# Patient Record
Sex: FEMALE | Employment: OTHER | URBAN - METROPOLITAN AREA
[De-identification: names, ages, dates, MRNs, and addresses within clinical notes are randomized per-mention and may not be internally consistent; named-entity substitution may affect disease eponyms.]

---

## 2019-05-01 ENCOUNTER — CLINICAL SUPPORT (OUTPATIENT)
Dept: CARDIOLOGY | Facility: CLINIC | Age: 79
End: 2019-05-01
Attending: INTERNAL MEDICINE
Payer: MEDICARE

## 2019-05-01 DIAGNOSIS — I25.10 ATHEROSCLEROSIS OF NATIVE CORONARY ARTERY OF NATIVE HEART WITHOUT ANGINA PECTORIS: ICD-10-CM

## 2019-05-01 DIAGNOSIS — R07.9 CHEST PAIN, UNSPECIFIED TYPE: ICD-10-CM

## 2019-05-01 LAB
CV STRESS BASE HR: 58 BPM
DIASTOLIC BLOOD PRESSURE: 63 MMHG
OHS CV CPX 85 PERCENT MAX PREDICTED HEART RATE MALE: 115
OHS CV CPX MAX PREDICTED HEART RATE: 135
OHS CV CPX PATIENT IS FEMALE: 1
OHS CV CPX PATIENT IS MALE: 0
OHS CV CPX PEAK DIASTOLIC BLOOD PRESSURE: 50 MMHG
OHS CV CPX PEAK HEAR RATE: 95 BPM
OHS CV CPX PEAK RATE PRESSURE PRODUCT: NORMAL
OHS CV CPX PEAK SYSTOLIC BLOOD PRESSURE: 136 MMHG
OHS CV CPX PERCENT MAX PREDICTED HEART RATE ACHIEVED: 71
OHS CV CPX RATE PRESSURE PRODUCT PRESENTING: 7366
SYSTOLIC BLOOD PRESSURE: 127 MMHG

## 2019-05-01 PROCEDURE — 93016 CV STRESS TEST SUPVJ ONLY: CPT | Mod: S$GLB,,, | Performed by: STUDENT IN AN ORGANIZED HEALTH CARE EDUCATION/TRAINING PROGRAM

## 2019-05-01 PROCEDURE — 93018 CV STRESS TEST I&R ONLY: CPT | Mod: S$GLB,,, | Performed by: STUDENT IN AN ORGANIZED HEALTH CARE EDUCATION/TRAINING PROGRAM

## 2019-05-01 PROCEDURE — 93016 TREADMILL STRESS TEST (CUPID ONLY): ICD-10-PCS | Mod: S$GLB,,, | Performed by: STUDENT IN AN ORGANIZED HEALTH CARE EDUCATION/TRAINING PROGRAM

## 2019-05-01 PROCEDURE — 93018 TREADMILL STRESS TEST (CUPID ONLY): ICD-10-PCS | Mod: S$GLB,,, | Performed by: STUDENT IN AN ORGANIZED HEALTH CARE EDUCATION/TRAINING PROGRAM

## 2022-12-04 ENCOUNTER — OUTSIDE PLACE OF SERVICE (OUTPATIENT)
Dept: CARDIOLOGY | Facility: CLINIC | Age: 82
End: 2022-12-04
Payer: MEDICARE

## 2022-12-04 PROCEDURE — 93010 PR ELECTROCARDIOGRAM REPORT: ICD-10-PCS | Mod: ,,, | Performed by: INTERNAL MEDICINE

## 2022-12-04 PROCEDURE — 93010 ELECTROCARDIOGRAM REPORT: CPT | Mod: ,,, | Performed by: INTERNAL MEDICINE

## 2025-06-05 ENCOUNTER — HOSPITAL ENCOUNTER (INPATIENT)
Facility: HOSPITAL | Age: 85
LOS: 2 days | Discharge: HOME | DRG: 390 | End: 2025-06-08
Attending: EMERGENCY MEDICINE | Admitting: INTERNAL MEDICINE
Payer: MEDICARE

## 2025-06-05 DIAGNOSIS — K52.89 STERCORAL COLITIS: Primary | ICD-10-CM

## 2025-06-05 DIAGNOSIS — N17.9 AKI (ACUTE KIDNEY INJURY): ICD-10-CM

## 2025-06-05 DIAGNOSIS — K59.00 CONSTIPATION: ICD-10-CM

## 2025-06-05 DIAGNOSIS — E87.6 HYPOKALEMIA: ICD-10-CM

## 2025-06-05 PROCEDURE — 25000003 PHARM REV CODE 250: Performed by: EMERGENCY MEDICINE

## 2025-06-05 RX ORDER — SODIUM CHLORIDE 0.9 G/100ML
500 IRRIGANT IRRIGATION
Status: COMPLETED | OUTPATIENT
Start: 2025-06-05 | End: 2025-06-05

## 2025-06-05 RX ORDER — SYRING-NEEDL,DISP,INSUL,0.3 ML 29 G X1/2"
296 SYRINGE, EMPTY DISPOSABLE MISCELLANEOUS
Status: COMPLETED | OUTPATIENT
Start: 2025-06-05 | End: 2025-06-05

## 2025-06-05 RX ORDER — PSEUDOEPHEDRINE/ACETAMINOPHEN 30MG-500MG
100 TABLET ORAL
Status: COMPLETED | OUTPATIENT
Start: 2025-06-05 | End: 2025-06-05

## 2025-06-05 RX ADMIN — Medication 100 ML: at 10:06

## 2025-06-05 RX ADMIN — MAGNESIUM CITRATE 296 ML: 1.75 LIQUID ORAL at 10:06

## 2025-06-05 RX ADMIN — SODIUM CHLORIDE 500 ML: 900 IRRIGANT IRRIGATION at 10:06

## 2025-06-05 NOTE — ED PROVIDER NOTES
Encounter Date: 6/5/2025       History     Chief Complaint   Patient presents with    Constipation     C/o constipation x4 days. Pt taking suppositories and stool softeners w/o relief. Pt c/o pain to rectum. +rectal bleeding when wiping.      Patient is a 85-year-old female who presents to the ER with a complaint of constipation.  Patient states she has not had a significant bowel movement for approximately 4 days despite use of oral stool softeners.  Report pain to the left lower and right lower quadrant intermittent in nature.  She denies any chest pain or shortness of breath.  She denies any rectal bleeding contrary to was written in the nurse's triage note.  She denies any new medications or other triggers that may be the source of her constipation.      Review of patient's allergies indicates:   Allergen Reactions    Cetirizine Nausea And Vomiting     Unknown reaction    Fexofenadine Nausea And Vomiting     Unknown reaction     History reviewed. No pertinent past medical history.  History reviewed. No pertinent surgical history.  No family history on file.  Social History[1]  Review of Systems   Constitutional:  Negative for chills, fatigue and fever.   Respiratory:  Negative for chest tightness.    Cardiovascular:  Negative for chest pain, palpitations and leg swelling.   Gastrointestinal:  Positive for abdominal pain and constipation. Negative for nausea and vomiting.   Musculoskeletal:  Negative for back pain, myalgias, neck pain and neck stiffness.   Skin:  Negative for pallor and rash.   Neurological:  Negative for dizziness and headaches.   Psychiatric/Behavioral:  Negative for agitation and confusion.        Physical Exam     Initial Vitals [06/05/25 1500]   BP Pulse Resp Temp SpO2   (!) 149/65 83 20 98.2 °F (36.8 °C) 99 %      MAP       --         Physical Exam    Nursing note and vitals reviewed.  Constitutional: She appears well-developed and well-nourished.   HENT:   Head: Normocephalic and  atraumatic.   Eyes: EOM are normal. Pupils are equal, round, and reactive to light.   Neck:   Normal range of motion.  Cardiovascular:  Normal rate.           Pulmonary/Chest: Breath sounds normal.   Abdominal: There is abdominal tenderness.   Genitourinary:    Genitourinary Comments: There is a large external nonthrombosed hemorrhoid at approximally 5/6 o'clock position.  There is hard stool in the rectal vault.  I was able to remove some of this via digital disimpaction.  However the patient still has significant amount of stool in the rectal vault.     Musculoskeletal:         General: Normal range of motion.      Cervical back: Normal range of motion.     Neurological: She is alert and oriented to person, place, and time. She has normal strength. GCS score is 15. GCS eye subscore is 4. GCS verbal subscore is 5. GCS motor subscore is 6.   Skin: Skin is warm. Capillary refill takes less than 2 seconds.   Psychiatric: She has a normal mood and affect.         ED Course   Procedures  Labs Reviewed   CBC W/ AUTO DIFFERENTIAL    Narrative:     The following orders were created for panel order CBC auto differential.  Procedure                               Abnormality         Status                     ---------                               -----------         ------                     CBC with Differential[[id number]]                                                        Please view results for these tests on the individual orders.   COMPREHENSIVE METABOLIC PANEL   URINALYSIS, REFLEX TO URINE CULTURE   LACTIC ACID, PLASMA   CBC WITH DIFFERENTIAL          Imaging Results               CT Abdomen Pelvis  Without Contrast (Final result)  Result time 06/05/25 18:46:05      Final result by Estefanía Fernandez MD (06/05/25 18:46:05)                   Impression:      Findings of distal colon constipation/impaction with mucosal thickening and transmural inflammatory changes/edema in the perirectal fat concerning for  stercoral colitis.  Trace amount of presacral free fluid noted.  No evidence of abscess or extraluminal free air.    No proximal colon constipation or evidence of bowel obstruction or other acute abnormality.    Nonobstructing nephrolithiasis in the right kidney.    Right posterolateral flank fat containing hernia and additional incidental nonacute findings as detailed above.    This report was flagged in Epic as abnormal.      Electronically signed by: Estefanía Fernandez  Date:    06/05/2025  Time:    18:46               Narrative:    EXAMINATION:  CT ABDOMEN PELVIS WITHOUT CONTRAST    CLINICAL HISTORY:  Abdominal abscess/infection suspected;    TECHNIQUE:  Low dose axial images, sagittal and coronal reformations were obtained from the lung bases to the pubic symphysis.  Contrast was not administered.    COMPARISON:  KUB 06/05/2025    FINDINGS:  There is dependent atelectasis in the lung bases with band like opacities more prominent on the right.  Chronic changes are in the differential.  There is no consolidation or pleural effusion.  The visualized heart is nonenlarged.  There is no pericardial effusion.  Aortic valve calcifications in the left coronary artery calcifications noted.    The lack of intravenous contrast limits evaluation of the solid organs for focal lesions.    Liver appears homogeneous and is nonenlarged.  There is no CT evidence of cholelithiasis or cholecystitis.  Spleen appears homogeneous and nonenlarged.  Pancreas and adrenal glands are unremarkable.    There is nonobstructing 2 mm calculus in the lower pole of the right kidney.  No other nephrolithiasis.  There is no hydro ureterectasis.  No renal cortical lesions.    Bladder is unremarkable.    Stomach small bowel are unremarkable.  Appendix is unremarkable.  There is distal rectal constipation/mild impaction.  Mucosal thickening is seen around the rectum and there is perirectal fat stranding with presacral strandy changes also noted.  Trace  amount of fluid may be present.    There is no evidence of bowel obstruction or significant constipation more proximally.  There are scattered diverticuli in the distal colon without evidence of diverticulitis.  There is no evidence of transmural inflammation/edema elsewhere or adenopathy.  No extraluminal free air or free fluid in the abdomen or pelvis is seen.    There is no evidence of retroperitoneal adenopathy.  Aorta contains mild to moderate atherosclerosis without aneurysm.    There is a moderate sized right posterolateral flank fat containing hernia lateral to the right paraspinous muscle group around the level of the lower pole tip of the right kidney.  There is also note bilateral fat containing moderate inguinal hernias.    Spondylosis and osteopenia of the spine noted.  No subluxation or compression fractures.  Degenerative changes of the femoroacetabular joint noted bilaterally.                                       X-Ray Abdomen AP 1 View (KUB) (Final result)  Result time 06/05/25 16:01:40      Final result by Gibran Villegas MD (06/05/25 16:01:40)                   Impression:      1. Moderate stool in the colon could reflect constipation noting prominent stool in the rectum.  No high-grade obstruction.      Electronically signed by: Gibran Villegas MD  Date:    06/05/2025  Time:    16:01               Narrative:    EXAMINATION:  XR ABDOMEN AP 1 VIEW    CLINICAL HISTORY:  Constipation, unspecified    TECHNIQUE:  AP View(s) of the abdomen was performed.    COMPARISON:  None    FINDINGS:  Two views abdomen supine.    The bilateral sacroiliac joints are intact.  The pubic symphysis is intact.  The bilateral femoroacetabular joints are intact.  There is remote injury of the right inferior pubic ramus.  Air and stool is seen within the large bowel and projected over the rectum.  No focally dilated small bowel loops.  There are no calcifications to suggest nephrolithiasis or cholelithiasis.  There is  coarse interstitial attenuation throughout the visualized lower lung zones, edema or other pneumonitis not excluded.                                       Medications   glycerin 99.5% topical solution 100 mL (100 mLs Rectal Given 6/5/25 2210)     And   magnesium citrate solution 296 mL (296 mLs Rectal Given 6/5/25 2210)     And   sodium chloride 0.9% irrigation 500 mL (500 mLs Rectal Given 6/5/25 2210)     Medical Decision Making  Amount and/or Complexity of Data Reviewed  Labs: ordered.  Radiology: ordered. Decision-making details documented in ED Course.    Risk  OTC drugs.  Prescription drug management.               ED Course as of 06/06/25 0021   Thu Jun 05, 2025   1851 CT Abdomen Pelvis  Without Contrast(!)  Findings of distal colon constipation/impaction with mucosal thickening and transmural inflammatory changes/edema in the perirectal fat concerning for stercoral colitis.  Trace amount of presacral free fluid noted.  No evidence of abscess or extraluminal free air.     No proximal colon constipation or evidence of bowel obstruction or other acute abnormality.     Nonobstructing nephrolithiasis in the right kidney.     Right posterolateral flank fat containing hernia and additional incidental nonacute findings as detailed above per final radiology read.       [LC]   2350 PATIENT DID NOT HAVE A SUCCESSFUL BM AFTER BROWN BOMBER ENEMA.  I ATTEMPTED TO MANUALLY DISIMPACT HER AND WAS ABLE TO GET A SMALL AMOUNT OF FECAL MATTER FROM HER RECTUM BUT I ABORTED THE PROCEDURE SECONDARY TO THE PATIENT'S PAIN.  SHE DOES HAVE A VERY LARGE EXTERNAL NONTHROMBOSED HEMORRHOID WHICH IS LIKELY CONTRIBUTING TO HER PLIGHT. [LC]   3840 I DISCUSSED THE CASE WITH THE [facility] RESIDENT WHO WILL ADMIT PATIENT TO HIS SERVICE. [LC]      ED Course User Index  [LC] To Helm MD                           Clinical Impression:  Final diagnoses:  [K59.00] Constipation  [K52.89] Stercoral colitis (Primary)          ED  Disposition Condition    Observation                       [1]   Social History  Tobacco Use    Smoking status: Unknown        To Helm MD  06/06/25 0021

## 2025-06-05 NOTE — FIRST PROVIDER EVALUATION
Emergency Department TeleTriage Encounter Note      CHIEF COMPLAINT    Chief Complaint   Patient presents with    Constipation     C/o constipation x4 days. Pt taking suppositories and stool softeners w/o relief. Pt c/o pain to rectum. +rectal bleeding when wiping.        VITAL SIGNS   Initial Vitals [06/05/25 1500]   BP Pulse Resp Temp SpO2   (!) 149/65 83 20 98.2 °F (36.8 °C) 99 %      MAP       --            ALLERGIES    Review of patient's allergies indicates:   Allergen Reactions    Cetirizine Nausea And Vomiting     Unknown reaction    Fexofenadine Nausea And Vomiting     Unknown reaction       PROVIDER TRIAGE NOTE  85 year old female presents to the ER with complaints of constipation, bloating, and concern for constipation x 4 days. Last BM was 4 days ago. Has tried enema and suppositories without relief. Denies abdominal pain. Also reports new onset rectal pain and some light rectal bleeding when wiping. No urinary complaints. No nausea or vomiting.     AAOx3, respirations even and non- labored, stable vitals, normal coloration of skin, sitting upright in triage chair, appears in no acute distress.              ORDERS  Labs Reviewed - No data to display    ED Orders (720h ago, onward)      None              Virtual Visit Note: The provider triage portion of this emergency department evaluation and documentation was performed via [employer], a HIPAA-compliant telemedicine application, in concert with a tele-presenter in the room. A face to face patient evaluation with one of my colleagues will occur once the patient is placed in an emergency department room.      DISCLAIMER: This note was prepared with M*Map[employer] voice recognition transcription software. Garbled syntax, mangled pronouns, and other bizarre constructions may be attributed to that software system.

## 2025-06-06 PROBLEM — N17.9 AKI (ACUTE KIDNEY INJURY): Status: ACTIVE | Noted: 2025-06-06

## 2025-06-06 PROBLEM — E87.6 HYPOKALEMIA: Status: ACTIVE | Noted: 2025-06-06

## 2025-06-06 PROBLEM — K52.89 STERCORAL COLITIS: Status: ACTIVE | Noted: 2025-06-06

## 2025-06-06 LAB
ABSOLUTE EOSINOPHIL (OHS): 0.01 K/UL
ABSOLUTE EOSINOPHIL (OHS): 0.13 K/UL
ABSOLUTE MONOCYTE (OHS): 0.66 K/UL (ref 0.3–1)
ABSOLUTE MONOCYTE (OHS): 0.71 K/UL (ref 0.3–1)
ABSOLUTE NEUTROPHIL COUNT (OHS): 10.3 K/UL (ref 1.8–7.7)
ABSOLUTE NEUTROPHIL COUNT (OHS): 8.94 K/UL (ref 1.8–7.7)
ALBUMIN SERPL BCP-MCNC: 3.8 G/DL (ref 3.5–5.2)
ALBUMIN SERPL BCP-MCNC: 3.9 G/DL (ref 3.5–5.2)
ALP SERPL-CCNC: 61 UNIT/L (ref 40–150)
ALP SERPL-CCNC: 61 UNIT/L (ref 40–150)
ALT SERPL W/O P-5'-P-CCNC: 16 UNIT/L (ref 10–44)
ALT SERPL W/O P-5'-P-CCNC: 17 UNIT/L (ref 10–44)
ANION GAP (OHS): 10 MMOL/L (ref 8–16)
ANION GAP (OHS): 12 MMOL/L (ref 8–16)
ANION GAP (OHS): 19 MMOL/L (ref 8–16)
AST SERPL-CCNC: 32 UNIT/L (ref 11–45)
AST SERPL-CCNC: 35 UNIT/L (ref 11–45)
BASOPHILS # BLD AUTO: 0.02 K/UL
BASOPHILS # BLD AUTO: 0.02 K/UL
BASOPHILS NFR BLD AUTO: 0.2 %
BASOPHILS NFR BLD AUTO: 0.2 %
BILIRUB SERPL-MCNC: 0.8 MG/DL (ref 0.1–1)
BILIRUB SERPL-MCNC: 0.8 MG/DL (ref 0.1–1)
BILIRUB UR QL STRIP.AUTO: NEGATIVE
BUN SERPL-MCNC: 26 MG/DL (ref 8–23)
BUN SERPL-MCNC: 27 MG/DL (ref 8–23)
BUN SERPL-MCNC: 28 MG/DL (ref 8–23)
CALCIUM SERPL-MCNC: 10 MG/DL (ref 8.7–10.5)
CALCIUM SERPL-MCNC: 10.2 MG/DL (ref 8.7–10.5)
CALCIUM SERPL-MCNC: 10.3 MG/DL (ref 8.7–10.5)
CHLORIDE SERPL-SCNC: 100 MMOL/L (ref 95–110)
CHLORIDE SERPL-SCNC: 100 MMOL/L (ref 95–110)
CHLORIDE SERPL-SCNC: 101 MMOL/L (ref 95–110)
CHOLEST SERPL-MCNC: 258 MG/DL (ref 120–199)
CHOLEST/HDLC SERPL: 4.4 {RATIO} (ref 2–5)
CLARITY UR: CLEAR
CO2 SERPL-SCNC: 25 MMOL/L (ref 23–29)
CO2 SERPL-SCNC: 28 MMOL/L (ref 23–29)
CO2 SERPL-SCNC: 29 MMOL/L (ref 23–29)
COLOR UR AUTO: YELLOW
CREAT SERPL-MCNC: 1.4 MG/DL (ref 0.5–1.4)
CREAT SERPL-MCNC: 1.5 MG/DL (ref 0.5–1.4)
CREAT SERPL-MCNC: 1.6 MG/DL (ref 0.5–1.4)
CREAT UR-MCNC: 143 MG/DL (ref 15–325)
ERYTHROCYTE [DISTWIDTH] IN BLOOD BY AUTOMATED COUNT: 13.2 % (ref 11.5–14.5)
ERYTHROCYTE [DISTWIDTH] IN BLOOD BY AUTOMATED COUNT: 13.3 % (ref 11.5–14.5)
GFR SERPLBLD CREATININE-BSD FMLA CKD-EPI: 31 ML/MIN/1.73/M2
GFR SERPLBLD CREATININE-BSD FMLA CKD-EPI: 34 ML/MIN/1.73/M2
GFR SERPLBLD CREATININE-BSD FMLA CKD-EPI: 36 ML/MIN/1.73/M2
GLUCOSE SERPL-MCNC: 127 MG/DL (ref 70–110)
GLUCOSE SERPL-MCNC: 144 MG/DL (ref 70–110)
GLUCOSE SERPL-MCNC: 189 MG/DL (ref 70–110)
GLUCOSE UR QL STRIP: NEGATIVE
HCT VFR BLD AUTO: 30.3 % (ref 37–48.5)
HCT VFR BLD AUTO: 31.1 % (ref 37–48.5)
HDLC SERPL-MCNC: 59 MG/DL (ref 40–75)
HDLC SERPL: 22.9 % (ref 20–50)
HGB BLD-MCNC: 10 GM/DL (ref 12–16)
HGB BLD-MCNC: 10.3 GM/DL (ref 12–16)
HGB UR QL STRIP: NEGATIVE
HOLD SPECIMEN: NORMAL
IMM GRANULOCYTES # BLD AUTO: 0.02 K/UL (ref 0–0.04)
IMM GRANULOCYTES # BLD AUTO: 0.05 K/UL (ref 0–0.04)
IMM GRANULOCYTES NFR BLD AUTO: 0.2 % (ref 0–0.5)
IMM GRANULOCYTES NFR BLD AUTO: 0.4 % (ref 0–0.5)
KETONES UR QL STRIP: NEGATIVE
LACTATE SERPL-SCNC: 1.6 MMOL/L (ref 0.5–2.2)
LDLC SERPL CALC-MCNC: 180.8 MG/DL (ref 63–159)
LEUKOCYTE ESTERASE UR QL STRIP: NEGATIVE
LYMPHOCYTES # BLD AUTO: 0.8 K/UL (ref 1–4.8)
LYMPHOCYTES # BLD AUTO: 1.71 K/UL (ref 1–4.8)
MAGNESIUM SERPL-MCNC: 2.4 MG/DL (ref 1.6–2.6)
MAGNESIUM SERPL-MCNC: 2.6 MG/DL (ref 1.6–2.6)
MCH RBC QN AUTO: 29.9 PG (ref 27–31)
MCH RBC QN AUTO: 30.3 PG (ref 27–31)
MCHC RBC AUTO-ENTMCNC: 33 G/DL (ref 32–36)
MCHC RBC AUTO-ENTMCNC: 33.1 G/DL (ref 32–36)
MCV RBC AUTO: 91 FL (ref 82–98)
MCV RBC AUTO: 92 FL (ref 82–98)
MICROSCOPIC COMMENT: NORMAL
NITRITE UR QL STRIP: NEGATIVE
NONHDLC SERPL-MCNC: 199 MG/DL
NUCLEATED RBC (/100WBC) (OHS): 0 /100 WBC
NUCLEATED RBC (/100WBC) (OHS): 0 /100 WBC
PH UR STRIP: 7 [PH]
PHOSPHATE SERPL-MCNC: 3.2 MG/DL (ref 2.7–4.5)
PLATELET # BLD AUTO: 282 K/UL (ref 150–450)
PLATELET # BLD AUTO: 290 K/UL (ref 150–450)
PMV BLD AUTO: 9.3 FL (ref 9.2–12.9)
PMV BLD AUTO: 9.6 FL (ref 9.2–12.9)
POCT GLUCOSE: 127 MG/DL (ref 70–110)
POCT GLUCOSE: 150 MG/DL (ref 70–110)
POCT GLUCOSE: 167 MG/DL (ref 70–110)
POCT GLUCOSE: 173 MG/DL (ref 70–110)
POTASSIUM SERPL-SCNC: 2.8 MMOL/L (ref 3.5–5.1)
POTASSIUM SERPL-SCNC: 3.1 MMOL/L (ref 3.5–5.1)
POTASSIUM SERPL-SCNC: 3.7 MMOL/L (ref 3.5–5.1)
PROT SERPL-MCNC: 8.2 GM/DL (ref 6–8.4)
PROT SERPL-MCNC: 8.3 GM/DL (ref 6–8.4)
PROT UR QL STRIP: ABNORMAL
RBC # BLD AUTO: 3.34 M/UL (ref 4–5.4)
RBC # BLD AUTO: 3.4 M/UL (ref 4–5.4)
RBC #/AREA URNS AUTO: 1 /HPF (ref 0–4)
RELATIVE EOSINOPHIL (OHS): 0.1 %
RELATIVE EOSINOPHIL (OHS): 1.1 %
RELATIVE LYMPHOCYTE (OHS): 14.9 % (ref 18–48)
RELATIVE LYMPHOCYTE (OHS): 6.7 % (ref 18–48)
RELATIVE MONOCYTE (OHS): 5.7 % (ref 4–15)
RELATIVE MONOCYTE (OHS): 6 % (ref 4–15)
RELATIVE NEUTROPHIL (OHS): 77.7 % (ref 38–73)
RELATIVE NEUTROPHIL (OHS): 86.8 % (ref 38–73)
SODIUM SERPL-SCNC: 139 MMOL/L (ref 136–145)
SODIUM SERPL-SCNC: 141 MMOL/L (ref 136–145)
SODIUM SERPL-SCNC: 144 MMOL/L (ref 136–145)
SODIUM UR-SCNC: 163 MMOL/L (ref 20–250)
SP GR UR STRIP: 1.02
SQUAMOUS #/AREA URNS AUTO: 4 /HPF
T4 FREE SERPL-MCNC: 0.78 NG/DL (ref 0.71–1.51)
TRIGL SERPL-MCNC: 91 MG/DL (ref 30–150)
TSH SERPL-ACNC: 42.74 UIU/ML (ref 0.4–4)
UROBILINOGEN UR STRIP-ACNC: ABNORMAL EU/DL
WBC # BLD AUTO: 11.51 K/UL (ref 3.9–12.7)
WBC # BLD AUTO: 11.86 K/UL (ref 3.9–12.7)
WBC #/AREA URNS AUTO: 1 /HPF (ref 0–5)

## 2025-06-06 PROCEDURE — 80061 LIPID PANEL: CPT

## 2025-06-06 PROCEDURE — 83605 ASSAY OF LACTIC ACID: CPT | Performed by: EMERGENCY MEDICINE

## 2025-06-06 PROCEDURE — 81001 URINALYSIS AUTO W/SCOPE: CPT

## 2025-06-06 PROCEDURE — 25000003 PHARM REV CODE 250

## 2025-06-06 PROCEDURE — 80053 COMPREHEN METABOLIC PANEL: CPT | Performed by: EMERGENCY MEDICINE

## 2025-06-06 PROCEDURE — 63600175 PHARM REV CODE 636 W HCPCS

## 2025-06-06 PROCEDURE — 11000001 HC ACUTE MED/SURG PRIVATE ROOM

## 2025-06-06 PROCEDURE — 36415 COLL VENOUS BLD VENIPUNCTURE: CPT | Performed by: HEALTH CARE PROVIDER

## 2025-06-06 PROCEDURE — 25000003 PHARM REV CODE 250: Performed by: HEALTH CARE PROVIDER

## 2025-06-06 PROCEDURE — 82570 ASSAY OF URINE CREATININE: CPT

## 2025-06-06 PROCEDURE — 80053 COMPREHEN METABOLIC PANEL: CPT | Mod: 91

## 2025-06-06 PROCEDURE — 84100 ASSAY OF PHOSPHORUS: CPT | Performed by: HEALTH CARE PROVIDER

## 2025-06-06 PROCEDURE — 84300 ASSAY OF URINE SODIUM: CPT

## 2025-06-06 PROCEDURE — 36415 COLL VENOUS BLD VENIPUNCTURE: CPT

## 2025-06-06 PROCEDURE — 83735 ASSAY OF MAGNESIUM: CPT | Mod: 59

## 2025-06-06 PROCEDURE — 85025 COMPLETE CBC W/AUTO DIFF WBC: CPT | Performed by: EMERGENCY MEDICINE

## 2025-06-06 PROCEDURE — 84439 ASSAY OF FREE THYROXINE: CPT

## 2025-06-06 PROCEDURE — 85025 COMPLETE CBC W/AUTO DIFF WBC: CPT | Mod: 91

## 2025-06-06 PROCEDURE — 83735 ASSAY OF MAGNESIUM: CPT | Mod: 91 | Performed by: HEALTH CARE PROVIDER

## 2025-06-06 RX ORDER — PANTOPRAZOLE SODIUM 20 MG/1
20 TABLET, DELAYED RELEASE ORAL DAILY
COMMUNITY

## 2025-06-06 RX ORDER — DOCUSATE SODIUM 100 MG/1
100 CAPSULE, LIQUID FILLED ORAL DAILY
Status: DISCONTINUED | OUTPATIENT
Start: 2025-06-06 | End: 2025-06-08 | Stop reason: HOSPADM

## 2025-06-06 RX ORDER — ACETAMINOPHEN 325 MG/1
650 TABLET ORAL EVERY 6 HOURS PRN
Status: DISCONTINUED | OUTPATIENT
Start: 2025-06-06 | End: 2025-06-08 | Stop reason: HOSPADM

## 2025-06-06 RX ORDER — PRAVASTATIN SODIUM 20 MG/1
20 TABLET ORAL DAILY
Status: DISCONTINUED | OUTPATIENT
Start: 2025-06-06 | End: 2025-06-08 | Stop reason: HOSPADM

## 2025-06-06 RX ORDER — PANTOPRAZOLE SODIUM 40 MG/1
40 TABLET, DELAYED RELEASE ORAL DAILY
Status: DISCONTINUED | OUTPATIENT
Start: 2025-06-07 | End: 2025-06-08 | Stop reason: HOSPADM

## 2025-06-06 RX ORDER — HYDROCHLOROTHIAZIDE 25 MG/1
25 TABLET ORAL DAILY
COMMUNITY

## 2025-06-06 RX ORDER — IBUPROFEN 200 MG
16 TABLET ORAL
Status: DISCONTINUED | OUTPATIENT
Start: 2025-06-06 | End: 2025-06-08 | Stop reason: HOSPADM

## 2025-06-06 RX ORDER — IBUPROFEN 200 MG
24 TABLET ORAL
Status: DISCONTINUED | OUTPATIENT
Start: 2025-06-06 | End: 2025-06-08 | Stop reason: HOSPADM

## 2025-06-06 RX ORDER — SENNOSIDES 8.6 MG/1
8.6 TABLET ORAL 2 TIMES DAILY
Status: DISCONTINUED | OUTPATIENT
Start: 2025-06-06 | End: 2025-06-08 | Stop reason: HOSPADM

## 2025-06-06 RX ORDER — LATANOPROST 50 UG/ML
1 SOLUTION/ DROPS OPHTHALMIC NIGHTLY
COMMUNITY

## 2025-06-06 RX ORDER — ASPIRIN 81 MG/1
81 TABLET ORAL DAILY
COMMUNITY

## 2025-06-06 RX ORDER — KETOROLAC TROMETHAMINE 30 MG/ML
15 INJECTION, SOLUTION INTRAMUSCULAR; INTRAVENOUS ONCE
Status: COMPLETED | OUTPATIENT
Start: 2025-06-06 | End: 2025-06-06

## 2025-06-06 RX ORDER — PRAVASTATIN SODIUM 10 MG/1
20 TABLET ORAL DAILY
Status: ON HOLD | COMMUNITY
End: 2025-06-06

## 2025-06-06 RX ORDER — DEXTROMETHORPHAN POLISTIREX 30 MG/5 ML
1 SUSPENSION, EXTENDED RELEASE 12 HR ORAL DAILY PRN
Status: DISCONTINUED | OUTPATIENT
Start: 2025-06-06 | End: 2025-06-08 | Stop reason: HOSPADM

## 2025-06-06 RX ORDER — ASPIRIN 81 MG/1
81 TABLET ORAL DAILY
Status: DISCONTINUED | OUTPATIENT
Start: 2025-06-06 | End: 2025-06-08 | Stop reason: HOSPADM

## 2025-06-06 RX ORDER — SODIUM CHLORIDE 0.9 % (FLUSH) 0.9 %
10 SYRINGE (ML) INJECTION EVERY 12 HOURS PRN
Status: DISCONTINUED | OUTPATIENT
Start: 2025-06-06 | End: 2025-06-08 | Stop reason: HOSPADM

## 2025-06-06 RX ORDER — NALOXONE HCL 0.4 MG/ML
0.02 VIAL (ML) INJECTION
Status: DISCONTINUED | OUTPATIENT
Start: 2025-06-06 | End: 2025-06-08 | Stop reason: HOSPADM

## 2025-06-06 RX ORDER — GLUCAGON 1 MG
1 KIT INJECTION
Status: DISCONTINUED | OUTPATIENT
Start: 2025-06-06 | End: 2025-06-08 | Stop reason: HOSPADM

## 2025-06-06 RX ORDER — HYDROCHLOROTHIAZIDE 25 MG/1
25 TABLET ORAL DAILY
Status: DISCONTINUED | OUTPATIENT
Start: 2025-06-07 | End: 2025-06-08 | Stop reason: HOSPADM

## 2025-06-06 RX ORDER — ENOXAPARIN SODIUM 100 MG/ML
30 INJECTION SUBCUTANEOUS EVERY 24 HOURS
Status: DISCONTINUED | OUTPATIENT
Start: 2025-06-06 | End: 2025-06-08 | Stop reason: HOSPADM

## 2025-06-06 RX ORDER — LATANOPROST 50 UG/ML
1 SOLUTION/ DROPS OPHTHALMIC NIGHTLY
Status: DISCONTINUED | OUTPATIENT
Start: 2025-06-06 | End: 2025-06-08 | Stop reason: HOSPADM

## 2025-06-06 RX ORDER — POTASSIUM CHLORIDE 20 MEQ/1
20 TABLET, EXTENDED RELEASE ORAL ONCE
Status: COMPLETED | OUTPATIENT
Start: 2025-06-06 | End: 2025-06-06

## 2025-06-06 RX ORDER — LEVOTHYROXINE SODIUM 75 UG/1
75 TABLET ORAL
Status: ON HOLD | COMMUNITY
End: 2025-06-08 | Stop reason: HOSPADM

## 2025-06-06 RX ORDER — HYDROCHLOROTHIAZIDE 12.5 MG/1
12.5 TABLET ORAL DAILY
Status: DISCONTINUED | OUTPATIENT
Start: 2025-06-06 | End: 2025-06-06

## 2025-06-06 RX ORDER — LEVOTHYROXINE SODIUM 100 UG/1
100 TABLET ORAL
Status: DISCONTINUED | OUTPATIENT
Start: 2025-06-07 | End: 2025-06-08 | Stop reason: HOSPADM

## 2025-06-06 RX ORDER — POLYETHYLENE GLYCOL 3350 17 G/17G
17 POWDER, FOR SOLUTION ORAL 2 TIMES DAILY
Status: DISCONTINUED | OUTPATIENT
Start: 2025-06-06 | End: 2025-06-08 | Stop reason: HOSPADM

## 2025-06-06 RX ORDER — HYDROCHLOROTHIAZIDE 12.5 MG/1
12.5 CAPSULE ORAL DAILY
COMMUNITY
End: 2025-06-06

## 2025-06-06 RX ADMIN — SENNOSIDES 8.6 MG: 8.6 TABLET, FILM COATED ORAL at 08:06

## 2025-06-06 RX ADMIN — LACTULOSE 10 G: 20 SOLUTION ORAL at 02:06

## 2025-06-06 RX ADMIN — SODIUM CHLORIDE, POTASSIUM CHLORIDE, SODIUM LACTATE AND CALCIUM CHLORIDE 1000 ML: 600; 310; 30; 20 INJECTION, SOLUTION INTRAVENOUS at 02:06

## 2025-06-06 RX ADMIN — POTASSIUM CHLORIDE 20 MEQ: 1500 TABLET, EXTENDED RELEASE ORAL at 07:06

## 2025-06-06 RX ADMIN — HYDROCHLOROTHIAZIDE 12.5 MG: 12.5 TABLET ORAL at 08:06

## 2025-06-06 RX ADMIN — ACETAMINOPHEN 650 MG: 325 TABLET ORAL at 03:06

## 2025-06-06 RX ADMIN — POTASSIUM BICARBONATE 50 MEQ: 978 TABLET, EFFERVESCENT ORAL at 08:06

## 2025-06-06 RX ADMIN — PRAVASTATIN SODIUM 20 MG: 20 TABLET ORAL at 08:06

## 2025-06-06 RX ADMIN — POLYETHYLENE GLYCOL 3350 17 G: 17 POWDER, FOR SOLUTION ORAL at 08:06

## 2025-06-06 RX ADMIN — ASPIRIN 81 MG: 81 TABLET, COATED ORAL at 08:06

## 2025-06-06 RX ADMIN — ACETAMINOPHEN 650 MG: 325 TABLET ORAL at 04:06

## 2025-06-06 RX ADMIN — SODIUM CHLORIDE, POTASSIUM CHLORIDE, SODIUM LACTATE AND CALCIUM CHLORIDE 1000 ML: 600; 310; 30; 20 INJECTION, SOLUTION INTRAVENOUS at 06:06

## 2025-06-06 RX ADMIN — SODIUM CHLORIDE, POTASSIUM CHLORIDE, SODIUM LACTATE AND CALCIUM CHLORIDE 1000 ML: 600; 310; 30; 20 INJECTION, SOLUTION INTRAVENOUS at 05:06

## 2025-06-06 RX ADMIN — KETOROLAC TROMETHAMINE 15 MG: 30 INJECTION, SOLUTION INTRAMUSCULAR; INTRAVENOUS at 05:06

## 2025-06-06 RX ADMIN — LEVOTHYROXINE SODIUM 75 MCG: 0.05 TABLET ORAL at 05:06

## 2025-06-06 RX ADMIN — POTASSIUM BICARBONATE 50 MEQ: 977.5 TABLET, EFFERVESCENT ORAL at 02:06

## 2025-06-06 RX ADMIN — POLYETHYLENE GLYCOL 3350 17 G: 17 POWDER, FOR SOLUTION ORAL at 10:06

## 2025-06-06 RX ADMIN — SENNOSIDES 8.6 MG: 8.6 TABLET, FILM COATED ORAL at 10:06

## 2025-06-06 RX ADMIN — LATANOPROST 1 DROP: 50 SOLUTION OPHTHALMIC at 08:06

## 2025-06-06 RX ADMIN — ENOXAPARIN SODIUM 30 MG: 30 INJECTION SUBCUTANEOUS at 07:06

## 2025-06-06 NOTE — H&P
[facility] Team A HPI Note      Patient Name: Loni Dhillon  MRN: [id number]  Admission Date: 6/5/2025  Hospital Length of Stay: 0 days  Attending Provider: Sheila Joyner MD  Primary Care Provider: Jose Glynn MD  Principal Problem: Stercoral colitis      Chief Complaint:     Constipation for 4 days       History of Present Illness:       Loni Dhillon is a 85 y.o. female with PMHx of CAD s/p stent (2015), hypothyroidism, HLD, GERD, glaucoma. The patient presented to [facility] on 6/5/2025 with a primary complaint of constipation.    The patient was in her usual state of health including being independent on ADLs, until 4 days prior to admission when she began developing constipation. She reports she has never been constipated to this extent; she is passing flatus however her last normal BM was 4 days prior. She has tried dulcolax PO and suppository with no response. She complains of associated intermittent RLQ pain that is at times severe and is relieved with Tylenol. She denies nausea or vomiting. Her appetite has been diminished concurrently. She denies any urinary complaints. She denies any rectal bleeding. She denies any new medications, dietary changes, or recent illness. She recently moved back to her home here from [facility]; her daughter is at bedside. A complete ROS was conducted and negative except for what was in HPI.    MEDICAL HISTORY:      Past Medical History:  CAD s/p stent (2015)  Hypothyroidism  HLD  GERD  Glaucoma    Past Surgical History:  History reviewed. No pertinent surgical history.      Family History:   No family history on file.      Social History:   Alcohol use: no  Tobacco use: no  Recreational drug use: no  Occupation: retired teacher  Current living situation: lives at home      Allergies:  Review of patient's allergies indicates:   Allergen Reactions    Cetirizine Nausea And Vomiting     Unknown reaction    Fexofenadine Nausea And Vomiting      "Unknown reaction     Home Medications:  Current Outpatient Medications   Medication Instructions    aspirin (ECOTRIN) 81 mg, Daily    hydroCHLOROthiazide (MICROZIDE) 12.5 mg, Daily    latanoprost 0.005 % ophthalmic solution 1 drop, Nightly    levothyroxine (SYNTHROID) 75 mcg, Before breakfast    pantoprazole (PROTONIX) 20 mg, Daily    pravastatin (PRAVACHOL) 20 mg, Daily        ACTIVE MEDICATIONS:      Scheduled Medications    aspirin  81 mg Oral Daily    enoxparin  30 mg Subcutaneous Daily    hydroCHLOROthiazide  12.5 mg Oral Daily    lactated ringers  1,000 mL Intravenous Once    lactulose  10 g Oral Daily    latanoprost  1 drop Both Eyes QHS    levothyroxine  75 mcg Oral Before breakfast    [START ON 6/7/2025] pantoprazole  40 mg Oral Daily    pravastatin  20 mg Oral Daily      PRN Medications     Current Facility-Administered Medications:     acetaminophen, 650 mg, Oral, Q6H PRN    dextrose 50%, 12.5 g, Intravenous, PRN    dextrose 50%, 25 g, Intravenous, PRN    glucagon (human recombinant), 1 mg, Intramuscular, PRN    glucose, 16 g, Oral, PRN    glucose, 24 g, Oral, PRN    mineral oil, 1 enema, Rectal, Daily PRN    naloxone, 0.02 mg, Intravenous, PRN    sodium chloride 0.9%, 10 mL, Intravenous, Q12H PRN  IV Fluids             OBJECTIVE DATA:      Weight  Weight: 72.6 kg (160 lb)  Body mass index is 26.63 kg/m².    Triage Vitals  BP: (!) 149/65  Pulse: 83  Temp: 98.2 °F (36.8 °C)  Resp: 20  Height: 5' 5" (165.1 cm)  Weight: 72.6 kg (160 lb)  BMI (Calculated): 26.6  SpO2: 99 %    Latest Vitals Vital range last 24 hours   Temp: 98.2 °F (36.8 °C) (06/05/25 1500)  Pulse: 71 (06/06/25 0501)  Resp: 20 (06/05/25 2336)  BP: (!) 182/81 (06/06/25 0501)  SpO2: 98 % (06/06/25 0501) Temp:  [98.2 °F (36.8 °C)] 98.2 °F (36.8 °C)  Pulse:  [71-88] 71  Resp:  [20] 20  SpO2:  [98 %-100 %] 98 %  BP: (142-182)/(62-81) 182/81     In/out Last 24 hours In/Out Since Admission     Intake/Output Summary (Last 24 hours) at 6/6/2025 " "0604  Last data filed at 6/6/2025 0539  Gross per 24 hour   Intake 1072.42 ml   Output --   Net 1072.42 ml    Net IO Since Admission: 1,072.42 mL [06/06/25 0604]     Physical Exam  General: No acute distress. Appropriate behavior.   Head: normocephalic, atraumatic  Eyes: PERRL, EOMI, no conjunctival injections or icterus  ENT: MMM, posterior oropharynx without erythema  Neck: No thyromegaly or masses   Cardiac: Regular rate and rhythm, no murmurs appreciated  Pulmonary/Chest: CTAB, no wheezing or crackles, non-labored breathing  Abdomen: Soft, mild tenderness to deep palpation at the RLQ, non-distended, hypoactive bowel sounds, no rebound tenderness, no guarding  Rectal: Exam deferred at this time as ER physician performed exam just minutes before my arrival. On ER physician exam, there was a notable external non-thrombosed hemorrhoid at 5/6 o'clock position, hard stool in rectal vault with some successful digital disimpaction however it was aborted as patient expressed pain.  Extremities: atraumatic, no deformities, no edema  Skin: dry, warm, intact. No bruising or rashes.  Neuro: Alert and oriented, moving all extremities, sensation equal and symmetric     Labs  CBC:   Recent Labs   Lab 06/06/25  0021 06/06/25  0402   WBC 11.51 11.86   HGB 10.0* 10.3*   HCT 30.3* 31.1*    290   , CMP:   Recent Labs   Lab 06/06/25  0021 06/06/25  0402    144   K 2.8* 3.1*    100   CO2 29 25   * 144*   BUN 28* 27*   CREATININE 1.6* 1.5*   CALCIUM 10.2 10.3   PROT 8.3 8.2   ALBUMIN 3.9 3.8   BILITOT 0.8 0.8   ALKPHOS 61 61   AST 35 32   ALT 16 17   ANIONGAP 10 19*   , LFTs:   Recent Labs   Lab 06/06/25  0021 06/06/25  0402   ALT 16 17   AST 35 32   ALKPHOS 61 61   BILITOT 0.8 0.8   PROT 8.3 8.2   ALBUMIN 3.9 3.8   , Urine Studies: No results for input(s): "COLORU", "APPEARANCEUA", "PHUR", "SPECGRAV", "PROTEINUA", "GLUCUA", "KETONESU", "BILIRUBINUA", "OCCULTUA", "NITRITE", "UROBILINOGEN", "LEUKOCYTESUR", " ""RBCUA", "WBCUA", "BACTERIA", "SQUAMEPITHEL", "HYALINECASTS" in the last 48 hours.    Invalid input(s): "WRIGHTSUR", and All pertinent labs from the last 24 hours have been reviewed.    No results for input(s): "TROPONINI", "BNP" in the last 168 hours.    Glucose Trends  Recent Labs   Lab 06/06/25  0021 06/06/25  0402   * 144*       Last Urinalysis  No results for input(s): "COLORU", "APPEARANCEUA", "SPECGRAV", "PHUR", "PROTEINUA", "GLUCOSEUA", "KETONESU", "OCCULTUA", "NITRITE", "UROBILINOGEN", "BILIRUBINUA", "LEUKOCYTESUR", "RBCUA", "WBCUA", "BACTERIA", "SQUAMEPITHEL", "HYALINECASTS", "AMORPHOUS", "YEAST" in the last 168 hours.     Microbiology  Microbiology Results (last 7 days)       ** No results found for the last 168 hours. **          Antibiotics (From admission, onward)      None             EKG  None available to review    Radiology  CT Abdomen Pelvis  Without Contrast   Final Result   Abnormal      Findings of distal colon constipation/impaction with mucosal thickening and transmural inflammatory changes/edema in the perirectal fat concerning for stercoral colitis.  Trace amount of presacral free fluid noted.  No evidence of abscess or extraluminal free air.      No proximal colon constipation or evidence of bowel obstruction or other acute abnormality.      Nonobstructing nephrolithiasis in the right kidney.      Right posterolateral flank fat containing hernia and additional incidental nonacute findings as detailed above.      This report was flagged in Epic as abnormal.         Electronically signed by: Estefanía Fernandez   Date:    06/05/2025   Time:    18:46      X-Ray Abdomen AP 1 View (KUB)   Final Result      1. Moderate stool in the colon could reflect constipation noting prominent stool in the rectum.  No high-grade obstruction.         Electronically signed by: Gibran Villegas MD   Date:    06/05/2025   Time:    16:01           I have personally reviewed the above imaging    Assessment/Plan: " "    Loni Dhillon is a 85 y.o. female with a PMHx of CAD s/p stent (2015), hypothyroidism, HLD, GERD, glaucoma who presented with constipation. She is being admitted to [facility] for management of severe constipation with resultant stercoral colitis as noted on CT imaging.     Stercoral colitis  Constipation  - CT abdomen/pelvis w/o contrast with stercoral colitis; lactic acid 1.6  - s/p "Brown Bomb" enema in ED with some liquid stool produced  - manual disimpaction with some stool removal however largely limited due to pain  - will attempt further disimpaction in AM  - titrate bowel regimen to stool output; started lactulose 10g daily with significant response  - maintain on a clear liquid diet for now; supportive care with IV/PO fluids  - pain regimen with tylenol, ketorolac as needed    PIETRO  - Baseline Cr unknown  - Cr 1.6 on admission  - Follow up FENa studies  - S/p 2L LR; continue fluids as above    Hypokalemia  - K 2.8 on admit; Mg 2.6  - Replete K to goal > 4    Hypothyroidism  - TSH 42.7, free T4 0.78  - Clarify synthroid dosage with patient; believes she is on 75 mcg daily  - Will need dose adjustment prior to discharge     HTN  - /65 on presentation  - Continue home HCTZ 12.5    HLD  - Lipid panel pending  - Continue home pravastatin 20     GERD  - Continue home PPI    Glaucoma  - Continue latanoprost gtt OU     Diet: CLD  Glucose: goal 140-180  Bowels: lactulose 10 qd   VTE Ppx:  VTE Risk Mitigation (From admission, onward)           Ordered     enoxaparin injection 30 mg  Daily         06/06/25 0117     IP VTE HIGH RISK PATIENT  Once         06/06/25 0117     Place sequential compression device  Until discontinued         06/06/25 0117                    Indwelling Lines:          Peripheral IV - Single Lumen 06/06/25 0020 20 G Right Antecubital (Active)        Code Status: Full      Dispo: Obs pending symptom improvement      Patient seen and discussed with attending physician: Ar" Sheila BEE MD. Attestation may differ from plan, please appreciate.     Montrell Byrne, DO  [facility] PGY-2  [facility] Team A Night Float

## 2025-06-06 NOTE — PHARMACY MED REC
"    [facility]           Pharmacy  Admission Medication History     The home medication history was taken by Ruma San.      Medication history obtained from Medications listed below were obtained from: Patient/family.    Based on information gathered for medication list, you may go to "Admission" then "Reconcile Home Medications" tabs to review and/or act upon those items.     The home medication list has been updated by the Pharmacy department.   Please read ALL comments highlighted in yellow.   Please address this information as you see fit.    Feel free to contact us if you have any questions or require assistance.        No current facility-administered medications on file prior to encounter.     Current Outpatient Medications on File Prior to Encounter   Medication Sig Dispense Refill    aspirin (ECOTRIN) 81 MG EC tablet Take 81 mg by mouth once daily.      hydroCHLOROthiazide (HYDRODIURIL) 25 MG tablet Take 25 mg by mouth once daily.      latanoprost 0.005 % ophthalmic solution Place 1 drop into both eyes every evening.      levothyroxine (SYNTHROID) 75 MCG tablet Take 75 mcg by mouth before breakfast.      pantoprazole (PROTONIX) 20 MG tablet Take 20 mg by mouth once daily.         Please address this information as you see fit.  Feel free to contact us if you have any questions or require assistance.    Ruma San  [phone]              .          "

## 2025-06-06 NOTE — ED NOTES
Pt assisted onto bedside toilet, unsuccessful with having a BM after enema given, complaining of pain to her rectum, provider notified

## 2025-06-07 LAB
ABSOLUTE EOSINOPHIL (OHS): 0.21 K/UL
ABSOLUTE MONOCYTE (OHS): 0.99 K/UL (ref 0.3–1)
ABSOLUTE NEUTROPHIL COUNT (OHS): 8.16 K/UL (ref 1.8–7.7)
ALBUMIN SERPL BCP-MCNC: 3.2 G/DL (ref 3.5–5.2)
ALP SERPL-CCNC: 63 UNIT/L (ref 40–150)
ALT SERPL W/O P-5'-P-CCNC: 17 UNIT/L (ref 10–44)
ANION GAP (OHS): 13 MMOL/L (ref 8–16)
AST SERPL-CCNC: 32 UNIT/L (ref 11–45)
BASOPHILS # BLD AUTO: 0.02 K/UL
BASOPHILS NFR BLD AUTO: 0.2 %
BILIRUB SERPL-MCNC: 0.7 MG/DL (ref 0.1–1)
BUN SERPL-MCNC: 36 MG/DL (ref 8–23)
CALCIUM SERPL-MCNC: 9.7 MG/DL (ref 8.7–10.5)
CHLORIDE SERPL-SCNC: 101 MMOL/L (ref 95–110)
CO2 SERPL-SCNC: 26 MMOL/L (ref 23–29)
CREAT SERPL-MCNC: 1.6 MG/DL (ref 0.5–1.4)
ERYTHROCYTE [DISTWIDTH] IN BLOOD BY AUTOMATED COUNT: 13.3 % (ref 11.5–14.5)
GFR SERPLBLD CREATININE-BSD FMLA CKD-EPI: 31 ML/MIN/1.73/M2
GLUCOSE SERPL-MCNC: 106 MG/DL (ref 70–110)
HCT VFR BLD AUTO: 28.4 % (ref 37–48.5)
HGB BLD-MCNC: 9.3 GM/DL (ref 12–16)
HOLD SPECIMEN: NORMAL
IMM GRANULOCYTES # BLD AUTO: 0.04 K/UL (ref 0–0.04)
IMM GRANULOCYTES NFR BLD AUTO: 0.4 % (ref 0–0.5)
LYMPHOCYTES # BLD AUTO: 1.83 K/UL (ref 1–4.8)
MAGNESIUM SERPL-MCNC: 2.4 MG/DL (ref 1.6–2.6)
MCH RBC QN AUTO: 30.3 PG (ref 27–31)
MCHC RBC AUTO-ENTMCNC: 32.7 G/DL (ref 32–36)
MCV RBC AUTO: 93 FL (ref 82–98)
NUCLEATED RBC (/100WBC) (OHS): 0 /100 WBC
PLATELET # BLD AUTO: 278 K/UL (ref 150–450)
PMV BLD AUTO: 10 FL (ref 9.2–12.9)
POCT GLUCOSE: 107 MG/DL (ref 70–110)
POCT GLUCOSE: 110 MG/DL (ref 70–110)
POCT GLUCOSE: 111 MG/DL (ref 70–110)
POCT GLUCOSE: 115 MG/DL (ref 70–110)
POTASSIUM SERPL-SCNC: 3.8 MMOL/L (ref 3.5–5.1)
PROT SERPL-MCNC: 7.3 GM/DL (ref 6–8.4)
RBC # BLD AUTO: 3.07 M/UL (ref 4–5.4)
RELATIVE EOSINOPHIL (OHS): 1.9 %
RELATIVE LYMPHOCYTE (OHS): 16.3 % (ref 18–48)
RELATIVE MONOCYTE (OHS): 8.8 % (ref 4–15)
RELATIVE NEUTROPHIL (OHS): 72.4 % (ref 38–73)
SODIUM SERPL-SCNC: 140 MMOL/L (ref 136–145)
WBC # BLD AUTO: 11.25 K/UL (ref 3.9–12.7)

## 2025-06-07 PROCEDURE — 25000003 PHARM REV CODE 250: Performed by: HEALTH CARE PROVIDER

## 2025-06-07 PROCEDURE — 36415 COLL VENOUS BLD VENIPUNCTURE: CPT

## 2025-06-07 PROCEDURE — 85025 COMPLETE CBC W/AUTO DIFF WBC: CPT

## 2025-06-07 PROCEDURE — 11000001 HC ACUTE MED/SURG PRIVATE ROOM

## 2025-06-07 PROCEDURE — 63600175 PHARM REV CODE 636 W HCPCS

## 2025-06-07 PROCEDURE — 80053 COMPREHEN METABOLIC PANEL: CPT

## 2025-06-07 PROCEDURE — 25000003 PHARM REV CODE 250

## 2025-06-07 PROCEDURE — 83735 ASSAY OF MAGNESIUM: CPT

## 2025-06-07 RX ADMIN — ACETAMINOPHEN 650 MG: 325 TABLET ORAL at 04:06

## 2025-06-07 RX ADMIN — ENOXAPARIN SODIUM 30 MG: 30 INJECTION SUBCUTANEOUS at 06:06

## 2025-06-07 RX ADMIN — PANTOPRAZOLE SODIUM 40 MG: 40 TABLET, DELAYED RELEASE ORAL at 08:06

## 2025-06-07 RX ADMIN — PRAVASTATIN SODIUM 20 MG: 20 TABLET ORAL at 08:06

## 2025-06-07 RX ADMIN — HYDROCHLOROTHIAZIDE 25 MG: 25 TABLET ORAL at 08:06

## 2025-06-07 RX ADMIN — LEVOTHYROXINE SODIUM 100 MCG: 100 TABLET ORAL at 06:06

## 2025-06-07 RX ADMIN — LATANOPROST 1 DROP: 50 SOLUTION OPHTHALMIC at 08:06

## 2025-06-07 RX ADMIN — ASPIRIN 81 MG: 81 TABLET, COATED ORAL at 08:06

## 2025-06-07 RX ADMIN — LACTULOSE 10 G: 20 SOLUTION ORAL at 08:06

## 2025-06-07 RX ADMIN — POLYETHYLENE GLYCOL 3350 17 G: 17 POWDER, FOR SOLUTION ORAL at 09:06

## 2025-06-07 NOTE — PLAN OF CARE
Problem: Adult Inpatient Plan of Care  Goal: Plan of Care Review  Outcome: Progressing  Goal: Patient-Specific Goal (Individualized)  Outcome: Progressing  Goal: Absence of Hospital-Acquired Illness or Injury  Outcome: Progressing  Goal: Optimal Comfort and Wellbeing  Outcome: Progressing  Goal: Readiness for Transition of Care  Outcome: Progressing     Problem: Acute Kidney Injury/Impairment  Goal: Fluid and Electrolyte Balance  Outcome: Progressing  Goal: Improved Oral Intake  Outcome: Progressing  Goal: Effective Renal Function  Outcome: Progressing     Problem: Constipation  Goal: Effective Bowel Elimination  Outcome: Progressing     Problem: Fall Injury Risk  Goal: Absence of Fall and Fall-Related Injury  Outcome: Progressing

## 2025-06-07 NOTE — NURSING
Patient was bladder scanned and observed 560cc in bladder, in and out cath preformed and 700cc of urine was drained, patient tolerated with no problems, no c/o pain at present time, will continue with plan of care.

## 2025-06-07 NOTE — PLAN OF CARE
Patient is awake and alert. Patient given medications as given per MAR. Blood Glucose monitoring in place. PRN Tylenol given for abdominal pain. Safety maintained. Bed alarm set. Instructed to use call light for assistance. Family to bedside.        Problem: Adult Inpatient Plan of Care  Goal: Plan of Care Review  Outcome: Progressing     Problem: Skin Injury Risk Increased  Goal: Skin Health and Integrity  Outcome: Progressing     Problem: Skin Injury Risk Increased  Goal: Skin Health and Integrity  Outcome: Progressing

## 2025-06-07 NOTE — PLAN OF CARE
Pt. AAOx4 . Bed in lowest position. Call light within reach/personal items within reach. Received report from Pamela laws LPN. Arrived to ER with complaints constipation and rectal bleeding. Was given a brown bomb. Rectal tube was place due to loose stools. Pt. Arrived unit with rectal tube in place with 550 waste in collection bag.  Pt c/o abdomenal pain. Rated pain as a 8 on a 0-10 pain scale. Administered tylenol for pain. Pt. Safety remain during shift

## 2025-06-08 VITALS
DIASTOLIC BLOOD PRESSURE: 81 MMHG | OXYGEN SATURATION: 98 % | HEIGHT: 65 IN | SYSTOLIC BLOOD PRESSURE: 166 MMHG | TEMPERATURE: 98 F | WEIGHT: 164.44 LBS | BODY MASS INDEX: 27.4 KG/M2 | RESPIRATION RATE: 18 BRPM | HEART RATE: 73 BPM

## 2025-06-08 PROBLEM — E87.6 HYPOKALEMIA: Status: RESOLVED | Noted: 2025-06-06 | Resolved: 2025-06-08

## 2025-06-08 PROBLEM — K52.89 STERCORAL COLITIS: Status: RESOLVED | Noted: 2025-06-06 | Resolved: 2025-06-08

## 2025-06-08 LAB
ABSOLUTE EOSINOPHIL (OHS): 0.29 K/UL
ABSOLUTE MONOCYTE (OHS): 0.63 K/UL (ref 0.3–1)
ABSOLUTE NEUTROPHIL COUNT (OHS): 5.47 K/UL (ref 1.8–7.7)
ALBUMIN SERPL BCP-MCNC: 3.3 G/DL (ref 3.5–5.2)
ALP SERPL-CCNC: 60 UNIT/L (ref 40–150)
ALT SERPL W/O P-5'-P-CCNC: 15 UNIT/L (ref 10–44)
ANION GAP (OHS): 12 MMOL/L (ref 8–16)
AST SERPL-CCNC: 28 UNIT/L (ref 11–45)
BASOPHILS # BLD AUTO: 0.03 K/UL
BASOPHILS NFR BLD AUTO: 0.4 %
BILIRUB SERPL-MCNC: 0.6 MG/DL (ref 0.1–1)
BUN SERPL-MCNC: 28 MG/DL (ref 8–23)
CALCIUM SERPL-MCNC: 9.7 MG/DL (ref 8.7–10.5)
CHLORIDE SERPL-SCNC: 102 MMOL/L (ref 95–110)
CO2 SERPL-SCNC: 27 MMOL/L (ref 23–29)
CREAT SERPL-MCNC: 1.6 MG/DL (ref 0.5–1.4)
ERYTHROCYTE [DISTWIDTH] IN BLOOD BY AUTOMATED COUNT: 13.2 % (ref 11.5–14.5)
GFR SERPLBLD CREATININE-BSD FMLA CKD-EPI: 31 ML/MIN/1.73/M2
GLUCOSE SERPL-MCNC: 102 MG/DL (ref 70–110)
HCT VFR BLD AUTO: 27.1 % (ref 37–48.5)
HGB BLD-MCNC: 8.9 GM/DL (ref 12–16)
IMM GRANULOCYTES # BLD AUTO: 0.02 K/UL (ref 0–0.04)
IMM GRANULOCYTES NFR BLD AUTO: 0.2 % (ref 0–0.5)
LYMPHOCYTES # BLD AUTO: 1.89 K/UL (ref 1–4.8)
MAGNESIUM SERPL-MCNC: 2.2 MG/DL (ref 1.6–2.6)
MCH RBC QN AUTO: 29.9 PG (ref 27–31)
MCHC RBC AUTO-ENTMCNC: 32.8 G/DL (ref 32–36)
MCV RBC AUTO: 91 FL (ref 82–98)
NUCLEATED RBC (/100WBC) (OHS): 0 /100 WBC
PLATELET # BLD AUTO: 280 K/UL (ref 150–450)
PMV BLD AUTO: 10 FL (ref 9.2–12.9)
POCT GLUCOSE: 105 MG/DL (ref 70–110)
POCT GLUCOSE: 107 MG/DL (ref 70–110)
POTASSIUM SERPL-SCNC: 3.7 MMOL/L (ref 3.5–5.1)
PROT SERPL-MCNC: 7.4 GM/DL (ref 6–8.4)
RBC # BLD AUTO: 2.98 M/UL (ref 4–5.4)
RELATIVE EOSINOPHIL (OHS): 3.5 %
RELATIVE LYMPHOCYTE (OHS): 22.7 % (ref 18–48)
RELATIVE MONOCYTE (OHS): 7.6 % (ref 4–15)
RELATIVE NEUTROPHIL (OHS): 65.6 % (ref 38–73)
SODIUM SERPL-SCNC: 141 MMOL/L (ref 136–145)
WBC # BLD AUTO: 8.33 K/UL (ref 3.9–12.7)

## 2025-06-08 PROCEDURE — 85025 COMPLETE CBC W/AUTO DIFF WBC: CPT

## 2025-06-08 PROCEDURE — 80053 COMPREHEN METABOLIC PANEL: CPT

## 2025-06-08 PROCEDURE — 36415 COLL VENOUS BLD VENIPUNCTURE: CPT

## 2025-06-08 PROCEDURE — 83735 ASSAY OF MAGNESIUM: CPT

## 2025-06-08 PROCEDURE — 25000003 PHARM REV CODE 250: Performed by: HEALTH CARE PROVIDER

## 2025-06-08 PROCEDURE — 25000003 PHARM REV CODE 250

## 2025-06-08 RX ORDER — DOCUSATE SODIUM 100 MG/1
100 CAPSULE, LIQUID FILLED ORAL DAILY PRN
Qty: 15 CAPSULE | Refills: 3 | Status: SHIPPED | OUTPATIENT
Start: 2025-06-08

## 2025-06-08 RX ORDER — LEVOTHYROXINE SODIUM 100 UG/1
100 TABLET ORAL
Qty: 30 TABLET | Refills: 11 | Status: SHIPPED | OUTPATIENT
Start: 2025-06-09 | End: 2026-06-09

## 2025-06-08 RX ORDER — POLYETHYLENE GLYCOL 3350 17 G/17G
17 POWDER, FOR SOLUTION ORAL DAILY PRN
Qty: 1 PACKET | Refills: 0 | Status: SHIPPED | OUTPATIENT
Start: 2025-06-08

## 2025-06-08 RX ADMIN — PRAVASTATIN SODIUM 20 MG: 20 TABLET ORAL at 10:06

## 2025-06-08 RX ADMIN — PANTOPRAZOLE SODIUM 40 MG: 40 TABLET, DELAYED RELEASE ORAL at 10:06

## 2025-06-08 RX ADMIN — ASPIRIN 81 MG: 81 TABLET, COATED ORAL at 10:06

## 2025-06-08 RX ADMIN — HYDROCHLOROTHIAZIDE 25 MG: 25 TABLET ORAL at 10:06

## 2025-06-08 RX ADMIN — LEVOTHYROXINE SODIUM 100 MCG: 100 TABLET ORAL at 05:06

## 2025-06-08 NOTE — DISCHARGE SUMMARY
"[facility] Hospital Medicine Discharge Summary    Primary Team: [facility] Hospitalist Team A  Attending Physician: Sheila Joyner MD  Resident: Frantz  Intern: Stiven    Date of Admit: 6/5/2025  Date of Discharge: 6/8/2025    Discharge to: Home  Condition: Stable    Discharge Diagnoses     Problem List[1]    Consultants and Procedures     Consultants:  N/a    Procedures:   N/a    Imaging:  CT abdomen/pelvis: distal colon constipation/impaction with mucosal thickening and transmural inflammatory changes concerning for stercoral colitis    Brief History of Present Illness & Summary of Hospital Course      Loni Dhillon is a 85 y.o.female with a PMHx of  CAD s/p stent (2015), hypothyroidism, HLD, GERD, glaucoma  who presented on 6/5/2025 for constipation. CT abdomen pelvis concerning for stercoral colitis. Pt was disimpacted and initiated on bowel regimen. Rectal tube was placed due to continuing liquid stool. On day 2 of admission, rectal tube was removed and pt had multiple bowel movements. Bowel regimen was discontinued and pt was discharged on PRN Miralax and colace. Throughout admission, pt had fluctuating Cr level of 1.4-1.6; unclear baseline. Sent a referral to nephrology for further investigation of possible CKD. On the day of discharge, patient was afebrile with stable vital signs and will be discharged in stable condition to home with close follow up.     For the full HPI please refer to the History & Physical from this admission.    Hospital Course By Problem with Pertinent Findings     Stercoral colitis  Constipation  - CT abdomen/pelvis w/o contrast with stercoral colitis; lactic acid 1.6  - s/p "Brown Bomb" enema in ED with some liquid stool produced  - manual disimpaction with some stool removal however largely limited due to pain  - will attempt further disimpaction in AM  - titrate bowel regimen to stool output; stopped regimen due to multiple stools   - maintain on a clear liquid diet for now; supportive " care with IV/PO fluids  - pain regimen with tylenol  - Miralax and colace sent as PRN   - Sent referral to GI for stercoral constipation      PIETRO vs CKD  - Baseline Cr unknown  - Cr 1.6 on admission, fluctuating between 1.4-1.6 during admission   - FENa 1.1%  - S/p 3L LR  - Cr seems to be at her baseline  - Sent nephrology referral      Hypokalemia  - K 2.8 on admit; Mg 2.6  - Replete K to goal > 4     Hypothyroidism  - TSH 42.7, free T4 0.78  - Clarify synthroid dosage with patient; believes she is on 75 mcg daily  - Increased synthroid to 100 mcg  - Will follow-up with PCP outpatient, needs TSH check in 6 weeks      HTN  - /65 on presentation  - Continue home HCTZ 12.5     HLD  - Lipid panel pending  - Continue home pravastatin 20      GERD  - Continue home PPI     Glaucoma  - Continue latanoprost gtt OU    Discharge Medications        Medication List        START taking these medications      docusate sodium 100 MG capsule  Commonly known as: COLACE  Take 1 capsule (100 mg total) by mouth daily as needed for Constipation (take nightly).  Notes to patient: STOOL SOFTENER     polyethylene glycol 17 gram Pwpk  Commonly known as: GLYCOLAX  Take 17 g by mouth daily as needed for Constipation (Take if feeling constipated for >48 hours with a meal).  Notes to patient: STIMULANT LAXATIVE            CHANGE how you take these medications      levothyroxine 100 MCG tablet  Commonly known as: SYNTHROID  Take 1 tablet (100 mcg total) by mouth before breakfast.  Start taking on: June 9, 2025  What changed:   medication strength  how much to take  Notes to patient: DOSE INCREASED. You were taking 75mg daily            CONTINUE taking these medications      aspirin 81 MG EC tablet  Commonly known as: ECOTRIN     hydroCHLOROthiazide 25 MG tablet  Commonly known as: HYDRODIURIL     latanoprost 0.005 % ophthalmic solution     pantoprazole 20 MG tablet  Commonly known as: PROTONIX               Where to Get Your Medications         These medications were sent to [facility]/pharmacy #[id number] - [place], [place] - [address] [facility] [address]  [address], [place] [address]      Phone: [phone]   docusate sodium 100 MG capsule  levothyroxine 100 MCG tablet  polyethylene glycol 17 gram Pwpk          Discharge Information:   Diet:  Clear liquid, advance to regular    Physical Activity:  As tolerated             Instructions:  1. Take all medications as prescribed  2. Keep all follow-up appointments  3. Return to the hospital or call your primary care physicians if any worsening symptoms such as fever, chest pain, shortness of breath, return of symptoms, or any other concerns.    Follow-Up Appointments:  PCP  GI  Nephrology    Radha Saleem MD  [facility] PGY-1           [1]   Patient Active Problem List  Diagnosis    PIETRO (acute kidney injury)

## 2025-06-08 NOTE — PLAN OF CARE
Discharge orders noted. Additional clinical references attached.    Patient's discharge instructions given by bedside RN and reviewed via this VN with patient.    Education provided on new medication, diagnosis, and follow-up appointments.    All questions answered. Teach back method used. Patient verbalized understanding.    Transport to [facility] requested. Floor nurse notified.      06/08/25 1403   AVS Confirmation   Discharge instructions and AVS provided to and reviewed with patient and/or significant other. Yes

## 2025-06-08 NOTE — PROGRESS NOTES
[facility] Medicine Progress Note    Primary Team: [facility] Hospitalist Team A  Attending Physician: Sheila Joyner MD  Resident: Frantz  Intern: Stiven    Subjective:      No acute events overnight. Pt reports having multiple bowel movements yesterday without rectal tube.      Objective:     Last 24 Hour Vital Signs:  BP  Min: 120/69  Max: 168/79  Temp  Av.9 °F (36.6 °C)  Min: 97.7 °F (36.5 °C)  Max: 98.3 °F (36.8 °C)  Pulse  Av.8  Min: 65  Max: 75  Resp  Av  Min: 18  Max: 18  SpO2  Av.2 %  Min: 95 %  Max: 99 %  I/O last 3 completed shifts:  In: -   Out: 700 [Urine:700]    Physical Examination:  Physical Exam  Vitals reviewed.   Cardiovascular:      Rate and Rhythm: Normal rate and regular rhythm.      Pulses: Normal pulses.      Heart sounds: Normal heart sounds.   Pulmonary:      Effort: Pulmonary effort is normal.      Breath sounds: Normal breath sounds.   Abdominal:      General: Bowel sounds are normal. There is no distension.      Palpations: Abdomen is soft.      Tenderness: There is no abdominal tenderness.   Neurological:      General: No focal deficit present.      Mental Status: She is oriented to person, place, and time.           Laboratory:  Laboratory Data Reviewed:   Pertinent Findings:  Cr: 1.6    Microbiology Data Reviewed:   Pertinent Findings:  N/a    Other Results:  EKG (my interpretation):   No new EKGs    Radiology Data Reviewed:   Pertinent Findings:  N/a    Current Medications:     Infusions:       Scheduled:   aspirin  81 mg Oral Daily    docusate sodium  100 mg Oral Daily    enoxparin  30 mg Subcutaneous Daily    hydroCHLOROthiazide  25 mg Oral Daily    lactulose  10 g Oral Daily    latanoprost  1 drop Both Eyes QHS    levothyroxine  100 mcg Oral Before breakfast    pantoprazole  40 mg Oral Daily    polyethylene glycol  17 g Oral BID    pravastatin  20 mg Oral Daily    senna  8.6 mg Oral BID        PRN:    Current Facility-Administered Medications:     acetaminophen,  "650 mg, Oral, Q6H PRN    dextrose 50%, 12.5 g, Intravenous, PRN    dextrose 50%, 25 g, Intravenous, PRN    glucagon (human recombinant), 1 mg, Intramuscular, PRN    glucose, 16 g, Oral, PRN    glucose, 24 g, Oral, PRN    mineral oil, 1 enema, Rectal, Daily PRN    naloxone, 0.02 mg, Intravenous, PRN    sodium chloride 0.9%, 10 mL, Intravenous, Q12H PRN    Antibiotics and Day Number of Therapy:  N/a    Lines and Day Number of Therapy:  PIVs  Rectal tube    Assessment:     Loni Dhillon is a 85 y.o. female with a PMHx of CAD s/p stent (2015), hypothyroidism, HLD, GERD, glaucoma who presented with constipation. She is being admitted to [facility] medicine for management of severe constipation with resultant stercoral colitis as noted on CT imaging.        Plan:     Stercoral colitis  Constipation  - CT abdomen/pelvis w/o contrast with stercoral colitis; lactic acid 1.6  - s/p "Brown Bomb" enema in ED with some liquid stool produced  - manual disimpaction with some stool removal however largely limited due to pain  - will attempt further disimpaction in AM  - titrate bowel regimen to stool output; stopped regimen due to multiple stools   - maintain on a clear liquid diet for now; supportive care with IV/PO fluids  - pain regimen with tylenol, ketorolac as needed     PIETRO vs CKD  - Baseline Cr unknown  - Cr 1.6 on admission, fluctuating between 1.4-1.6 during admission   - FENa 1.1%  - S/p 3L LR  - Cr seems to be at her baseline     Hypokalemia  - K 2.8 on admit; Mg 2.6  - Replete K to goal > 4     Hypothyroidism  - TSH 42.7, free T4 0.78  - Clarify synthroid dosage with patient; believes she is on 75 mcg daily  - Will need dose adjustment prior to discharge      HTN  - /65 on presentation  - Continue home HCTZ 12.5     HLD  - Lipid panel pending  - Continue home pravastatin 20      GERD  - Continue home PPI     Glaucoma  - Continue latanoprost gtt OU    DVT prophylaxis: Lovenox  Code status: FULL  Diet: Clear " liquid    Disposition: Discharge today      Radha Saleem MD  [facility] Internal Medicine HO-1    [facility] Medicine Hospitalist Pager numbers:   [facility] Hospitalist Medicine Team A (Ar/Fely): [phone]  [facility] Hospitalist Medicine Team B (John/David):  [phone]

## 2025-06-08 NOTE — PLAN OF CARE
Problem: Adult Inpatient Plan of Care  Goal: Optimal Comfort and Wellbeing  Outcome: Progressing     Problem: Acute Kidney Injury/Impairment  Goal: Improved Oral Intake  Outcome: Progressing     Problem: Constipation  Goal: Effective Bowel Elimination  Outcome: Progressing       POC reviewed with pt, following- VSS, NADN, pt resting quietly this shift. Pt reports having 8 BM on prior shift, discussed with Dr. Saleem, travis/palma held this evening. Tolerating CLD. No complaints of pain. No falls or injuries noted, CB in reach at all times. Instructed to call for needs not met on rounds, v/u.

## 2025-06-08 NOTE — PLAN OF CARE
06/10/24 1409   Discharge Assessment   Assessment Type Discharge Planning Assessment   Source of Information patient;family   Communicated HANG with patient/caregiver Yes   Reason For Admission s/p rEV hip   People in Home spouse   Do you expect to return to your current living situation? Yes   Do you have help at home or someone to help you manage your care at home? Yes   Who are your caregiver(s) and their phone number(s)? - [employer] [phone]   Prior to hospitilization cognitive status: Alert/Oriented   Current cognitive status: Alert/Oriented   Walking or Climbing Stairs Difficulty yes   Walking or Climbing Stairs ambulation difficulty, requires equipment   Dressing/Bathing Difficulty yes   Dressing/Bathing dressing difficulty, assistance 1 person;bathing difficulty, requires equipment;dressing difficulty, requires equipment   Equipment Currently Used at Home raised toilet   Readmission within 30 days? No   Patient currently being followed by outpatient case management? No   Do you currently have service(s) that help you manage your care at home? No   Do you take prescription medications? Yes   Do you have prescription coverage? Yes   Coverage blue advantage   Do you have any problems affording any of your prescribed medications? No   Is the patient taking medications as prescribed? yes   Who is going to help you get home at discharge? [facility]   How do you get to doctors appointments? car, drives self   Are you on dialysis? No   Do you take coumadin? No   Discharge Plan A Home with family   Discharge Plan B Home with family   DME Needed Upon Discharge  walker, rolling;raised toilet;hip kit   Discharge Plan discussed with: Patient;Spouse/sig other   Transition of Care Barriers Mobility     S/p Rev THR. Spk w pt & [place] @ bedside --  to asst w homecare. Pt needs RW. Provider list given. Foc obtained.   Called/ faxed referral for dme to [facility]. Await auth.   Called/ faxed  Daughter Olivia Sorensen at bedside and is agreeable with discharge to home today. Follow up information added for AVS. Pt will be transported home by her daughter.      06/08/25 1249   Final Note   Assessment Type Final Discharge Note   Anticipated Discharge Disposition Home   What phone number can be called within the next 1-3 days to see how you are doing after discharge? [phone]   Hospital Resources/Appts/Education Provided Appointments scheduled and added to AVS   Post-Acute Status   Discharge Delays None known at this time          referral for outpatient therapy to [facility]; appt previously scheduled for Thur 6/13.    PCP: Dr Romario Guallpa  Contact # [phone].         Patient DID NOT participate in a pre-op exercise regimen